# Patient Record
Sex: FEMALE | Race: WHITE | Employment: FULL TIME | URBAN - METROPOLITAN AREA
[De-identification: names, ages, dates, MRNs, and addresses within clinical notes are randomized per-mention and may not be internally consistent; named-entity substitution may affect disease eponyms.]

---

## 2017-07-20 ENCOUNTER — HOSPITAL ENCOUNTER (OUTPATIENT)
Dept: MAMMOGRAPHY | Age: 67
Discharge: HOME | End: 2017-07-20
Attending: FAMILY MEDICINE
Payer: COMMERCIAL

## 2017-07-20 DIAGNOSIS — Z12.31 VISIT FOR SCREENING MAMMOGRAM: ICD-10-CM

## 2017-07-20 PROCEDURE — 77063 BREAST TOMOSYNTHESIS BI: CPT

## 2017-09-22 ENCOUNTER — HOSPITAL ENCOUNTER (OUTPATIENT)
Dept: VASCULAR SURGERY | Age: 67
Discharge: HOME | End: 2017-09-22
Attending: FAMILY MEDICINE
Payer: COMMERCIAL

## 2017-09-22 DIAGNOSIS — R60.0 EDEMA, LEG: ICD-10-CM

## 2017-09-22 PROCEDURE — 93970 EXTREMITY STUDY: CPT

## 2017-09-22 NOTE — PROCEDURES
[address]  *** FINAL REPORT ***    Name: Tricia Rodriguez  MRN: [id number]    Outpatient  : 05 Dec 1950  HIS Order #: [id number]  [id number] [facility] Visit #: [id number]  Date: 22 Sep 2017    TYPE OF TEST: Peripheral Venous Testing    REASON FOR TEST  Limb swelling    Right Leg:-  Deep venous thrombosis:           No  Superficial venous thrombosis:    No  Deep venous insufficiency:        No  Superficial venous insufficiency: No    Left Leg:-  Deep venous thrombosis:           No  Superficial venous thrombosis:    No  Deep venous insufficiency:        No  Superficial venous insufficiency: No      INTERPRETATION/FINDINGS  PROCEDURE:  BILATERAL LE VENOUS DUPLEX. Evaluation of lower extremity veins with ultrasound (B-mode imaging,  pulsed Doppler, color Doppler). Includes the common femoral, deep  femoral, femoral, popliteal, posterior tibial, peroneal, and great  saphenous veins. FINDINGS:  Unable to fully ebaluate the paired posterior tibial veins  or peroneal veins bliaterally due to patient body habitus. Gray scale  and color flow duplex images of the remaining veins in both lower  extremities demonstrate normal compressibility, spontaneous and  augmented flow profiles, and absence of filling defects throughout the   deep and superficial veins in both lower extremities. CONCLUSION:  Bilateral lower extremity venous duplex negative for deep   venous thrombosis or thrombophlebitis in the veins visualized. .    ADDITIONAL COMMENTS    I have personally reviewed the data relevant to the interpretation of  this  study. TECHNOLOGIST: Akhil Garcia RVT  Signed: 2017 02:37 PM    PHYSICIAN: Latonya Potts.  Celestino Pendleton MD  Signed: 2017 10:45 AM

## 2017-12-06 ENCOUNTER — HOSPITAL ENCOUNTER (OUTPATIENT)
Dept: PHYSICAL THERAPY | Age: 67
Discharge: HOME | End: 2017-12-06
Payer: COMMERCIAL

## 2017-12-06 PROCEDURE — 97110 THERAPEUTIC EXERCISES: CPT

## 2017-12-06 PROCEDURE — 97140 MANUAL THERAPY 1/> REGIONS: CPT

## 2017-12-06 PROCEDURE — 97162 PT EVAL MOD COMPLEX 30 MIN: CPT

## 2017-12-06 NOTE — PROGRESS NOTES
[facility]  Lymphedema Clinic and Cancer Rehabilitation  [facility] [address]  [address]  [address]      INITIAL EVALUATION    NAME: Sravani Boles AGE: 69 y.o. GENDER: female  DATE: 12/6/17  REFERRING PHYSICIAN: Dr. Jun Serrato  HISTORY AND BACKGROUND:  Patient presents today with c/o  >6mo of B/L LE swelling which seemed to get worse 9/20/17. Patient has had a recent L LE cellulitis infection and was on oral antibiotics to clear. Patient reports a h/o DVT's, PE, and IVC filter in the past.  Patient had gastric bypass 2001 and noted she had a recent 20# weight gain while caring of her mother while she was on hospice. Patient states her swelling started to increase around that time as she fell out of her normal exercise routine. Patient has h/o L ankle fusion and multiple ankle surgeries. H/o  R TKR 2014. Patient describes a family h/o swelling and remembers her grandmother legs \"weeping\", recalls her mother had big legs even with special shoes to accommodate her foot size. She states \"they all had severe swelling\"   Primary Diagnosis:  · B LE lymphedema, secondary (I89.0)  Other Treatment Diagnoses:  Primary lymphedema (Q82.0)   Swelling not relieved by elevation (R60.1 generalized edema)   Venous insufficiency I87.2;    Morbid Obesity (E66.01)  Date of Onset: 9/20/17  Present Symptoms and Functional Limitations: Patient reports a decrease ability to wear regular shoes, regular fitting pants, and increased swelling limiting her choices of clothing and shoe wear. Needs breaks to elevate legs due to swelling.     Lymphedema Life Impact Scale, Raw 39, 57%, CK    Past Medical History: (see media for patient supplied list as well)  Past Medical History:   Diagnosis Date    Arrhythmia     PVCs-chronic    Cancer [facility]) 1972    Uterine Cancer, Hysterectomy done    Coagulation defects     chronic anticoagulation    Glaucoma     Migraine 7/13/2010    Nausea & vomiting     hospitalized for hyperemesis post knee surg 3/2013    Osteoporosis     Other ill-defined conditions     osteoporosis    Other ill-defined conditions     glaucoma    Pulmonary embolism ([facility] [address] 7/13/2010    Thromboembolus ([facility] [address]     PE 2001 - on Coumadin prescibed by PCP Dr. Jelly Houser     Past Surgical History:   Procedure Laterality Date    ABDOMEN SURGERY [address] UNLISTED  2001    gastric bypass/Vit. D Def./malabsorption    HX CATARACT REMOVAL  9/23/13    right eye    HX GYN  1972    hysterectomy    HX ORTHOPAEDIC      back, knee, foot    HX TONSILLECTOMY      VASCULAR SURGERY PROCEDURE UNLIST  3/3/2004    Trapeze Filter     Current Medications:    Current Outpatient Prescriptions   Medication Sig    HYDROcodone-acetaminophen (NORCO) 5-325 mg per tablet Take 1 Tab by mouth every six (6) hours as needed for Pain.  traMADol (ULTRAM) 50 mg tablet Take 1 Tab by mouth every six (6) hours as needed for Pain. Trial of Ultram in place of [place]    psyllium (REGULOID) 0.52 gram capsule Take 1 Cap by mouth daily as needed.  HYDROcodone-acetaminophen (NORCO) 5-325 mg per tablet Take 1-2 Tabs by mouth every four (4) hours as needed for Pain.  rivaroxaban (XARELTO) 10 mg tablet Take 2 Tabs by mouth daily.  CALCIUM CARBONATE (CALCIUM 500 PO) Take 500 mg by mouth three (3) times daily.  ergocalciferol (VITAMIN D2) 50,000 unit capsule Take 200,000 Units by mouth every seven (7) days.  cyanocobalamin (VITAMIN B-12) 100 mcg tablet Take 200 mcg by mouth daily.  MULTIVITAMIN PO Take 2 Tabs by mouth daily.  IRON, FERROUS SULFATE, PO Take 27 mg by mouth daily.  MAGNESIUM HYDROXIDE (MAGNESIA PO) Take 333 mg by mouth daily.  omega-3 fatty acids-vitamin e (FISH OIL) 1,000 mg cap Take 1 Cap by mouth daily.  cyclobenzaprine (FLEXERIL) 5 mg tablet Take 10 mg by mouth as needed.  timolol (TIMOPTIC) 0.5 % ophthalmic solution Administer 1 Drop to both eyes daily.  bimatoprost (LUMIGAN) 0.01 % opthalmic drops Administer 1 Drop to both eyes nightly.  copper gluconate 2 mg Tab Take 2 mg by mouth daily.  ascorbic acid (VITAMIN C) 500 mg tablet Take 500 mg by mouth daily.  vitamin E (AQUA GEMS) 400 unit capsule Take 400 Units by mouth daily.  selenium 200 mcg Tab Take 200 mcg by mouth daily.  potassium gluconate 550 mg Tab Take 1 Tab by mouth daily. No current facility-administered medications for this encounter. 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    Living Situation: Private residence      Trainable Caregiver?:self, spouse   Self-care/ADLs: Indep    Mobility: Limited distance, Indep otherwise   Sleeping Arrangement:  Bed   Adaptive Equipment Owned: reacher  Previous Therapy:  Trial with OTC compression stockings that \"Did not work for [employer]:  None at this time    SUBJECTIVE:   Patients goals for therapy: To get the legs smaller    OBJECTIVE DATA SUMMARY:   EXAMINATION/PRESENTATION/DECISION MAKING:   Pain: worst 9/10 at night; best 4-5/10 ache type feeling           Self-Care and ADLs: Indep  Skin and Tissue Assessment:  Dermal Status:  (x )  Intact (x )  Dry   ( )  Tenuous ( )  Flaky   ( )  Wound/lesion present (x )  Scars.  R TKR healed scar, B/L ankle multiple places   ( )  Dermatitis    Texture/Consistency:  ( )  Boggy (x )  Pitting Edema L ankle and lower leg, L ankle and b/l great toe 2+   ( )  Brawny ( )  Combination   ( )  Fibrotic/Woody    Pigmentation/Color Change:  ( x)  Normal ( )  Hemosiderin   ( ) Red ( )  Erythematous   ( )  Hyperpigmented ( )  Hyperlipodermatosclerosis   Anomalies:  ( )  Lymphorrhea ( )  Vesicles   ( )  Petechiae ( )  Warty Vercusis   ( )  Bullae ( )  Papilloma   Circulatory:  ( )  FRANCESCO ( )  Varicosities:   ( )  Pulses (x )  Vascular studies ruled out DVT in past. Recent doppler 9/22/17 Neg for DVT b/l LE   (x )  DVT History + DVT 2001,2003 and PE 2001 with IVC filter 2003          Nails:  ( )  Normal  ( )  Fungus  (x) Painted    Stemmers Sign: pos  Height:  Height: 5' 2\" (157.5 cm)  Weight:  Weight: 93.4 kg (206 lb)   BMI:  BMI (calculated): 37.7  (36 or greater: adversely affecting lymphedema)    Volumetric Measurements:   Right:  13, 783.26 mL Left:  12,847.65 mL   % Difference: 7.28% Right > Left   (See scanned graph)  Range of Motion: R knee ext to flex 0-90 deg;  L Knee 0-115 deg  Strength: SLR flexion 3/5, ankle DF 4+/5,   Sensation:  Intact to light touch  Mobility: Indep  Safety:  Patient is alert and oriented:  Yes   Safety awareness:  Yes   Fall Risk?:  No   Patient given written fall prevention handout: Yes   Precautions:  Standard lymphedema precautions to include avoiding blood pressure readings, injections and IVs or other procedures/acts that could lead to broken skin on affected area, and avoiding excessive heat, resistive activity or altitude without compression garment    Functional Measure: In compliance with CMSs Claims Based Outcome Reporting, the following G-code set was chosen for this patient based on their primary functional limitation being treated: The outcome measure chosen to determine the severity of the functional limitation was the LLIS with a score of  which was correlated with the impairment scale. ?  Other PT/OT Primary Functional Limitations:     - CURRENT STATUS: CK - 40%-59% impaired, limited or restricted    - GOAL STATUS: CJ - 20%-39% impaired, limited or restricted    - D/C STATUS:  ---------------To be determined---------------     Physical Therapy Evaluation Charge Determination   History Examination Presentation Decision-Making   MEDIUM  Complexity : 1-2 comorbidities / personal factors will impact the outcome/ POC  MEDIUM Complexity : 3 Standardized tests and measures addressing body structure, function, activity limitation and / or participation in recreation  MEDIUM Complexity : Evolving with changing characteristics  Other outcome measures LLIS  MEDIUM      Based on the above components, the patient evaluation is determined to be of the following complexity level: MEDIUM    Evaluation Time: 7930-8375  TREATMENT PROVIDED:   1. Treatment description:  The patient was education regarding the role and function of the lymphatic system, and instructed in the lymphedema management protocol of complete decongestive therapy (CDT). This includes skin care to prevent breakdown or infection, lymphedema exercises, custom compression therapy options (bandaging, compression garments, compression pump, Raguel Naas, Jo[employer]Pak, [employer], etc. as needed), and decongestion with manual lymphatic drainage as indicated. We discussed the need for consistent compression system for lymphedema management. Patient was given the option to obtain a velcro style compression device or perform a trial of bandaging. Patient understands the need for decongestion prior and is willing to trial bandaging. Pt was given information regarding obtaining bandaging supplies. She will purchase prior to her next appointment  Treatment time:  1611-0434 Minutes: 20    2. Treatment description:  Diaphragmatic breathing instruction and skin care education was performed,applying low pH lotion to extremity using upward strokes to stimulate lymphatic vessels. Lotion application was performed with Eucerin in the proper manor to stimulate the LE lymphatic pathways.   She was also instructed to perform some elevation throughout the day and to cont with 1-2 hours of elevation in the afternoon. PT encouraged walking and/or return to pool exercise/aquatic due to other orthopedic issus limiting land based exercise. Patient had been a member at [employer] and with ca retaking of mother reports not going anymore. Treatment time:   Minutes: 20    ASSESSMENT:   Nagi Kevin is a 69 y.o. female who presents with stage 2 CVI/Lymphedema s/p h/o multiple DVT's LE; contributing obesity with h/o gastric bypass, h/o R TKR and multiple 6-7 surgeries on her feet. Patient also describes a family h/o LE swelling with her mother and maternal grandmother having \"severe swelling\"  The patient has had 1 cellulitis infection in the L LE and received oral medications to resolve. She does not present with any clinical s/s of infection today. The patient has a volume difference of 7.28% with the R LE being larger than the L. The patients swelling starts at the toes and travels up to the knees and above towards the groin. The patient has tried compression stockings in the past however reports they were ill fitting from the start. The patient may benefit from a velcro option at least initially and as she decongest's and reduces her weight she may be able to be fit into a custom garment. Patient will benefit from complete decongestive therapy (CDT) including manual lymphatic drainage (MLD) technique, short-stretch textile bandages/compression system to decongest limb, and Kinesiotaping as appropriate. Patient will receive instruction in proper skin care to recognize signs/symptoms of and prevent infection, therapeutic exercise, and self-MLD for independent home program and restorative lymphatic performance. This care is medically necessary due to the infection risk with lymphedema, and to improve functional activities.   CDT is necessary to resolve swelling to allow patient to return to wearing normal clothes/footwear, and prevent worsening of symptoms, such as venous stasis ulcerations, infections, or hospitalizations. Patient will be independent with home program strategies to allow improved ADL ability and mobility and to allow patient to return to greatest functional independence. Rehabilitation potential is considered to be Good. Factors which may influence rehabilitation potential include availability to get to appointments, cost of garments, h/o multiple orthopedic issues. Patient will benefit from physical therapy visits over 4-6 weeks to optimize improvement in these areas. PLAN OF CARE:   Recommendations and Planned Interventions:  Manual lymph drainage/complete decongestive therapy  Multi-layer compression bandaging (short-stretch)  Compression garment fitting/provision  Lymphedema therapeutic exercise  AROM/PROM/Strength/Coordination  Self-care training  Functional mobility training  Education in skin care and lymphedema precautions  Self-MLD education per home program  Self-bandaging education per home program  Caregiver education as needed  Wound care as needed     GOALS  Short term goals  Time frame: 3 weeks  1. Instruct the patient to be independent with proper skin care to prevent future skin breakdown and decrease the potential risk for infections that are associated with Lymphedema. 2. Patient will be independent with a personal lymphedema exercise program to assist with the lymphatic flow and reduce limb volume. 3. Patient will understand the signs and symptoms of acute infection. 4. Volume reduction by 200mL each LE.  5. Tolerate MLB for 6-8 hours for girth reduction. 6. LLIS score of 50% or better. Long term goals  Time frame: 6 weeks  1. Patient will have knowledge of the compression options and acquire a safe and  appropriate daytime and night time compression system to prevent   re-accumulation of fluid. 2.  Patient or family will be able to don/doff garments independently.   Garment  system effectiveness will be evaluated prior to discharge for better long-term management and outcomes. 3.   To transition patient to independent restorative phase of CDT. 4.  LLIS score of 41% or better  5. Reduce pain at worst to 4-5/10 in order to be able to stand at work. Patient has participated in goal setting and agrees to work toward plan of care. Patient was instructed to call if questions or concerns arise. Thank you for this referral.  Gwen Dubin, DPT   Time Calculation: 70 mins    TREATMENT PLAN EFFECTIVE DATES:   12/11/2017 TO 3/7/17  I have read the above plan of care for [facility]. I certify the above prescribed services are required by this patient and are medically necessary.   The above plan of care has been developed in conjunction with the lymphedema/physical therapist.       Physician Signature: ____________________________________Date:______________

## 2017-12-11 VITALS
DIASTOLIC BLOOD PRESSURE: 78 MMHG | OXYGEN SATURATION: 91 % | HEART RATE: 58 BPM | SYSTOLIC BLOOD PRESSURE: 122 MMHG | BODY MASS INDEX: 37.91 KG/M2 | HEIGHT: 62 IN | WEIGHT: 206 LBS

## 2018-09-07 ENCOUNTER — HOSPITAL ENCOUNTER (OUTPATIENT)
Dept: MAMMOGRAPHY | Age: 68
Discharge: HOME | End: 2018-09-07
Attending: FAMILY MEDICINE
Payer: COMMERCIAL

## 2018-09-07 DIAGNOSIS — Z12.31 VISIT FOR SCREENING MAMMOGRAM: ICD-10-CM

## 2018-09-07 PROCEDURE — 77063 BREAST TOMOSYNTHESIS BI: CPT
